# Patient Record
Sex: FEMALE | Race: ASIAN
[De-identification: names, ages, dates, MRNs, and addresses within clinical notes are randomized per-mention and may not be internally consistent; named-entity substitution may affect disease eponyms.]

---

## 2017-07-05 ENCOUNTER — RX ONLY (OUTPATIENT)
Age: 82
Setting detail: RX ONLY
End: 2017-07-05

## 2017-07-10 ENCOUNTER — RX ONLY (OUTPATIENT)
Age: 82
Setting detail: RX ONLY
End: 2017-07-10

## 2018-01-24 ENCOUNTER — RX ONLY (OUTPATIENT)
Age: 83
Setting detail: RX ONLY
End: 2018-01-24

## 2018-07-20 ENCOUNTER — RX ONLY (OUTPATIENT)
Age: 83
Setting detail: RX ONLY
End: 2018-07-20

## 2018-08-22 ENCOUNTER — RX ONLY (OUTPATIENT)
Age: 83
Setting detail: RX ONLY
End: 2018-08-22

## 2018-10-17 ENCOUNTER — APPOINTMENT (RX ONLY)
Dept: URBAN - METROPOLITAN AREA CLINIC 129 | Facility: CLINIC | Age: 83
Setting detail: DERMATOLOGY
End: 2018-10-17

## 2018-10-17 DIAGNOSIS — L50.1 IDIOPATHIC URTICARIA: ICD-10-CM | Status: WELL CONTROLLED

## 2018-10-17 PROCEDURE — ? COUNSELING

## 2018-10-17 PROCEDURE — 99214 OFFICE O/P EST MOD 30 MIN: CPT

## 2018-10-17 PROCEDURE — ? PRESCRIPTION

## 2018-10-17 ASSESSMENT — LOCATION DETAILED DESCRIPTION DERM
LOCATION DETAILED: PERIUMBILICAL SKIN
LOCATION DETAILED: MID TRAPEZIAL NECK

## 2018-10-17 ASSESSMENT — LOCATION SIMPLE DESCRIPTION DERM
LOCATION SIMPLE: ABDOMEN
LOCATION SIMPLE: TRAPEZIAL NECK

## 2018-10-17 ASSESSMENT — LOCATION ZONE DERM
LOCATION ZONE: TRUNK
LOCATION ZONE: NECK

## 2018-10-17 NOTE — HPI: RASH
What Type Of Note Output Would You Prefer (Optional)?: Standard Output
How Severe Is Your Rash?: moderate
Is This A New Presentation, Or A Follow-Up?: Follow Up Rash

## 2018-10-18 RX ORDER — OMALIZUMAB 202.5 MG/1.4ML
INJECTION, SOLUTION SUBCUTANEOUS
Qty: 2 | Refills: 2

## 2018-10-23 ENCOUNTER — APPOINTMENT (RX ONLY)
Dept: URBAN - METROPOLITAN AREA CLINIC 129 | Facility: CLINIC | Age: 83
Setting detail: DERMATOLOGY
End: 2018-10-23

## 2018-10-23 DIAGNOSIS — L50.1 IDIOPATHIC URTICARIA: ICD-10-CM

## 2018-10-23 PROCEDURE — ? PRESCRIPTION

## 2018-10-23 RX ORDER — OMALIZUMAB 202.5 MG/1.4ML
INJECTION, SOLUTION SUBCUTANEOUS
Qty: 2 | Refills: 3

## 2019-01-16 ENCOUNTER — APPOINTMENT (RX ONLY)
Dept: URBAN - METROPOLITAN AREA CLINIC 129 | Facility: CLINIC | Age: 84
Setting detail: DERMATOLOGY
End: 2019-01-16

## 2019-01-16 DIAGNOSIS — L50.1 IDIOPATHIC URTICARIA: ICD-10-CM | Status: WELL CONTROLLED

## 2019-01-16 PROCEDURE — ? TREATMENT REGIMEN

## 2019-01-16 PROCEDURE — ? COUNSELING

## 2019-01-16 PROCEDURE — ? PRESCRIPTION

## 2019-01-16 PROCEDURE — 99212 OFFICE O/P EST SF 10 MIN: CPT

## 2019-01-16 RX ORDER — LANOLIN ALCOHOL/MO/W.PET/CERES
CREAM (GRAM) TOPICAL
Qty: 1 | Refills: 6 | COMMUNITY
Start: 2019-01-16

## 2019-01-16 RX ORDER — TRIAMCINOLONE ACETONIDE 1 MG/G
OINTMENT TOPICAL
Qty: 1 | Refills: 3 | COMMUNITY
Start: 2019-01-16

## 2019-01-16 RX ADMIN — TRIAMCINOLONE ACETONIDE: 1 OINTMENT TOPICAL at 00:00

## 2019-01-16 RX ADMIN — Medication: at 00:00

## 2019-04-03 ENCOUNTER — APPOINTMENT (RX ONLY)
Dept: URBAN - METROPOLITAN AREA CLINIC 129 | Facility: CLINIC | Age: 84
Setting detail: DERMATOLOGY
End: 2019-04-03

## 2019-04-03 DIAGNOSIS — L30.9 DERMATITIS, UNSPECIFIED: ICD-10-CM

## 2019-04-03 DIAGNOSIS — L50.1 IDIOPATHIC URTICARIA: ICD-10-CM | Status: WELL CONTROLLED

## 2019-04-03 PROCEDURE — ? COUNSELING

## 2019-04-03 PROCEDURE — ? ADDITIONAL NOTES

## 2019-04-03 PROCEDURE — ? PRESCRIPTION

## 2019-04-03 PROCEDURE — 99213 OFFICE O/P EST LOW 20 MIN: CPT

## 2019-04-03 RX ORDER — TRIAMCINOLONE ACETONIDE 1 MG/G
OINTMENT TOPICAL
Qty: 1 | Refills: 3

## 2019-04-03 ASSESSMENT — LOCATION SIMPLE DESCRIPTION DERM
LOCATION SIMPLE: LEFT UPPER BACK
LOCATION SIMPLE: RIGHT LOWER BACK
LOCATION SIMPLE: ABDOMEN

## 2019-04-03 ASSESSMENT — LOCATION DETAILED DESCRIPTION DERM
LOCATION DETAILED: LEFT MID-UPPER BACK
LOCATION DETAILED: RIGHT LATERAL ABDOMEN
LOCATION DETAILED: RIGHT INFERIOR LATERAL LOWER BACK

## 2019-04-03 ASSESSMENT — LOCATION ZONE DERM: LOCATION ZONE: TRUNK

## 2019-04-03 NOTE — PROCEDURE: ADDITIONAL NOTES
Additional Notes: Recommended patient extend Xolair every 6 weeks for the next 3 treatments and then every 8 weeks. Will contact Dr. Eng to notify her to schedule Xolair every 6 weeks
Detail Level: Detailed

## 2019-04-17 ENCOUNTER — APPOINTMENT (RX ONLY)
Dept: URBAN - METROPOLITAN AREA CLINIC 129 | Facility: CLINIC | Age: 84
Setting detail: DERMATOLOGY
End: 2019-04-17

## 2019-04-17 DIAGNOSIS — L50.1 IDIOPATHIC URTICARIA: ICD-10-CM

## 2019-04-17 DIAGNOSIS — D22 MELANOCYTIC NEVI: ICD-10-CM

## 2019-04-17 DIAGNOSIS — L50.1 IDIOPATHIC URTICARIA: ICD-10-CM | Status: WELL CONTROLLED

## 2019-04-17 DIAGNOSIS — L30.9 DERMATITIS, UNSPECIFIED: ICD-10-CM

## 2019-04-17 PROBLEM — D22.62 MELANOCYTIC NEVI OF LEFT UPPER LIMB, INCLUDING SHOULDER: Status: ACTIVE | Noted: 2019-04-17

## 2019-04-17 PROCEDURE — ? PRESCRIPTION

## 2019-04-17 PROCEDURE — 99213 OFFICE O/P EST LOW 20 MIN: CPT | Mod: 25

## 2019-04-17 PROCEDURE — 11900 INJECT SKIN LESIONS </W 7: CPT

## 2019-04-17 PROCEDURE — ? BIOPSY BY SHAVE METHOD

## 2019-04-17 PROCEDURE — ? ADDITIONAL NOTES

## 2019-04-17 PROCEDURE — ? COUNSELING

## 2019-04-17 PROCEDURE — 11102 TANGNTL BX SKIN SINGLE LES: CPT

## 2019-04-17 PROCEDURE — ? INTRALESIONAL KENALOG

## 2019-04-17 RX ORDER — BETAMETHASONE DIPROPIONATE 0.5 MG/G
OINTMENT TOPICAL BID
Qty: 1 | Refills: 5 | COMMUNITY
Start: 2019-04-17

## 2019-04-17 RX ORDER — OMALIZUMAB 202.5 MG/1.4ML
INJECTION, SOLUTION SUBCUTANEOUS
Qty: 2 | Refills: 3

## 2019-04-17 RX ADMIN — BETAMETHASONE DIPROPIONATE: 0.5 OINTMENT TOPICAL at 00:00

## 2019-04-17 ASSESSMENT — LOCATION SIMPLE DESCRIPTION DERM
LOCATION SIMPLE: LEFT UPPER BACK
LOCATION SIMPLE: RIGHT SHOULDER
LOCATION SIMPLE: CHEST
LOCATION SIMPLE: LEFT SHOULDER
LOCATION SIMPLE: RIGHT LOWER BACK
LOCATION SIMPLE: ABDOMEN
LOCATION SIMPLE: RIGHT UPPER BACK

## 2019-04-17 ASSESSMENT — LOCATION DETAILED DESCRIPTION DERM
LOCATION DETAILED: RIGHT SUPERIOR MEDIAL UPPER BACK
LOCATION DETAILED: RIGHT INFERIOR LATERAL LOWER BACK
LOCATION DETAILED: UPPER STERNUM
LOCATION DETAILED: LEFT POSTERIOR SHOULDER
LOCATION DETAILED: RIGHT LATERAL ABDOMEN
LOCATION DETAILED: RIGHT SUPERIOR LATERAL LOWER BACK
LOCATION DETAILED: RIGHT POSTERIOR SHOULDER
LOCATION DETAILED: LEFT MEDIAL UPPER BACK
LOCATION DETAILED: LEFT SUPERIOR UPPER BACK

## 2019-04-17 ASSESSMENT — LOCATION ZONE DERM
LOCATION ZONE: ARM
LOCATION ZONE: TRUNK

## 2019-04-17 NOTE — PROCEDURE: BIOPSY BY SHAVE METHOD
Wound Care: Petrolatum
Type Of Destruction Used: Curettage
Detail Level: Detailed
Consent: Written consent was obtained and risks were reviewed including but not limited to scarring, infection, bleeding, scabbing, incomplete removal, nerve damage and allergy to anesthesia.
Biopsy Method: Dermablade
Electrodesiccation And Curettage Text: The wound bed was treated with electrodesiccation and curettage after the biopsy was performed.
Size Of Lesion In Cm: 0.6
X Size Of Lesion In Cm: 0
Anesthesia Type: 1% lidocaine with epinephrine
Electrodesiccation Text: The wound bed was treated with electrodesiccation after the biopsy was performed.
Biopsy Type: H and E
Billing Type: Third-Party Bill
Render Post-Care Instructions In Note?: no
Notification Instructions: Patient will be notified of biopsy results. However, patient instructed to call the office if not contacted within 2 weeks.
Dressing: bandage
Post-Care Instructions: I reviewed with the patient in detail post-care instructions. Patient is to keep the biopsy site dry overnight, and then apply bacitracin twice daily until healed. Patient may apply hydrogen peroxide soaks to remove any crusting.
Was A Bandage Applied: Yes
Silver Nitrate Text: The wound bed was treated with silver nitrate after the biopsy was performed.
Cryotherapy Text: The wound bed was treated with cryotherapy after the biopsy was performed.
Curettage Text: The wound bed was treated with curettage after the biopsy was performed.
Anesthesia Volume In Cc: 0.8
Depth Of Biopsy: dermis
Hemostasis: Drysol

## 2019-04-17 NOTE — PROCEDURE: INTRALESIONAL KENALOG
Include Z78.9 (Other Specified Conditions Influencing Health Status) As An Associated Diagnosis?: No
Concentration Of Kenalog Solution Injected (Mg/Ml): 2.5
Detail Level: Zone
Medical Necessity Clause: This procedure was medically necessary because the lesions that were treated were:
Kenalog Preparation: Kenalog
Total Volume (Ccs): 1
X Size Of Lesion In Cm (Optional): 0
Administered By (Optional): Savanna Nevarez, NP
Consent: The risks of atrophy were reviewed with the patient.

## 2019-04-17 NOTE — PROCEDURE: MIPS QUALITY
Detail Level: Detailed
Quality 111:Pneumonia Vaccination Status For Older Adults: Pneumococcal Vaccination Previously Received
Quality 110: Preventive Care And Screening: Influenza Immunization: Influenza Immunization Administered during Influenza season
Quality 474: Zoster Vaccination Status: Shingrix Vaccination Administered or Previously Received
Quality 226: Preventive Care And Screening: Tobacco Use: Screening And Cessation Intervention: Patient screened for tobacco use and is an ex/non-smoker
Quality 431: Preventive Care And Screening: Unhealthy Alcohol Use - Screening: Patient screened for unhealthy alcohol use using a single question and scores less than 2 times per year
Quality 402: Tobacco Use And Help With Quitting Among Adolescents: Patient screened for tobacco and never smoked

## 2019-07-31 ENCOUNTER — APPOINTMENT (RX ONLY)
Dept: URBAN - METROPOLITAN AREA CLINIC 129 | Facility: CLINIC | Age: 84
Setting detail: DERMATOLOGY
End: 2019-07-31

## 2019-07-31 DIAGNOSIS — L50.1 IDIOPATHIC URTICARIA: ICD-10-CM | Status: WELL CONTROLLED

## 2019-07-31 DIAGNOSIS — L82.1 OTHER SEBORRHEIC KERATOSIS: ICD-10-CM

## 2019-07-31 DIAGNOSIS — L82.0 INFLAMED SEBORRHEIC KERATOSIS: ICD-10-CM

## 2019-07-31 PROBLEM — Z79.899 OTHER LONG TERM (CURRENT) DRUG THERAPY: Status: ACTIVE | Noted: 2019-07-31

## 2019-07-31 PROBLEM — J30.1 ALLERGIC RHINITIS DUE TO POLLEN: Status: ACTIVE | Noted: 2019-07-31

## 2019-07-31 PROCEDURE — 17110 DESTRUCTION B9 LES UP TO 14: CPT

## 2019-07-31 PROCEDURE — 99213 OFFICE O/P EST LOW 20 MIN: CPT | Mod: 25

## 2019-07-31 PROCEDURE — ? ADDITIONAL NOTES

## 2019-07-31 PROCEDURE — ? COUNSELING

## 2019-07-31 PROCEDURE — ? LIQUID NITROGEN

## 2019-07-31 ASSESSMENT — LOCATION DETAILED DESCRIPTION DERM
LOCATION DETAILED: LEFT INGUINAL CREASE
LOCATION DETAILED: RIGHT SUPRAPUBIC SKIN
LOCATION DETAILED: LEFT MEDIAL UPPER BACK
LOCATION DETAILED: RIGHT SUPERIOR MEDIAL UPPER BACK
LOCATION DETAILED: LEFT ANTERIOR PROXIMAL THIGH
LOCATION DETAILED: PERIUMBILICAL SKIN
LOCATION DETAILED: RIGHT ANTERIOR PROXIMAL THIGH
LOCATION DETAILED: RIGHT LATERAL ABDOMEN

## 2019-07-31 ASSESSMENT — LOCATION SIMPLE DESCRIPTION DERM
LOCATION SIMPLE: ABDOMEN
LOCATION SIMPLE: RIGHT THIGH
LOCATION SIMPLE: RIGHT UPPER BACK
LOCATION SIMPLE: GROIN
LOCATION SIMPLE: LEFT THIGH
LOCATION SIMPLE: LEFT UPPER BACK

## 2019-07-31 ASSESSMENT — LOCATION ZONE DERM
LOCATION ZONE: LEG
LOCATION ZONE: TRUNK

## 2019-07-31 NOTE — PROCEDURE: LIQUID NITROGEN
Post-Care Instructions: I reviewed with the patient in detail post-care instructions. Patient is to wear sunprotection, and avoid picking at any of the treated lesions. Pt may apply Vaseline to crusted or scabbing areas.
Include Z78.9 (Other Specified Conditions Influencing Health Status) As An Associated Diagnosis?: No
Consent: The patient's consent was obtained including but not limited to risks of crusting, scabbing, blistering, scarring, darker or lighter pigmentary change, recurrence, incomplete removal and infection.
Medical Necessity Clause: This procedure was medically necessary because the lesions that were treated were:
Number Of Freeze-Thaw Cycles: 1 freeze-thaw cycle
Medical Necessity Information: It is in your best interest to select a reason for this procedure from the list below. All of these items fulfill various CMS LCD requirements except the new and changing color options.
Detail Level: Detailed

## 2019-11-06 ENCOUNTER — RX ONLY (OUTPATIENT)
Age: 84
Setting detail: RX ONLY
End: 2019-11-06

## 2019-11-06 ENCOUNTER — APPOINTMENT (RX ONLY)
Dept: URBAN - METROPOLITAN AREA CLINIC 129 | Facility: CLINIC | Age: 84
Setting detail: DERMATOLOGY
End: 2019-11-06

## 2019-11-06 DIAGNOSIS — L82.1 OTHER SEBORRHEIC KERATOSIS: ICD-10-CM

## 2019-11-06 DIAGNOSIS — L50.1 IDIOPATHIC URTICARIA: ICD-10-CM | Status: WELL CONTROLLED

## 2019-11-06 PROCEDURE — ? ADDITIONAL NOTES

## 2019-11-06 PROCEDURE — 99213 OFFICE O/P EST LOW 20 MIN: CPT

## 2019-11-06 PROCEDURE — ? COUNSELING

## 2019-11-06 PROCEDURE — ? PRESCRIPTION

## 2019-11-06 RX ORDER — TRIAMCINOLONE ACETONIDE 1 MG/G
OINTMENT TOPICAL
Qty: 1 | Refills: 3

## 2019-11-06 ASSESSMENT — LOCATION SIMPLE DESCRIPTION DERM
LOCATION SIMPLE: LEFT UPPER BACK
LOCATION SIMPLE: LEFT THIGH
LOCATION SIMPLE: ABDOMEN

## 2019-11-06 ASSESSMENT — LOCATION DETAILED DESCRIPTION DERM
LOCATION DETAILED: LEFT ANTERIOR PROXIMAL THIGH
LOCATION DETAILED: RIGHT LATERAL ABDOMEN
LOCATION DETAILED: PERIUMBILICAL SKIN
LOCATION DETAILED: LEFT MEDIAL UPPER BACK

## 2019-11-06 ASSESSMENT — LOCATION ZONE DERM
LOCATION ZONE: LEG
LOCATION ZONE: TRUNK

## 2019-11-06 NOTE — PROCEDURE: ADDITIONAL NOTES
Detail Level: Zone
Additional Notes: Continue Xolair every 6 weeks, Dr. Eng’s office is buying and billing the Xolair

## 2020-05-06 ENCOUNTER — APPOINTMENT (RX ONLY)
Dept: URBAN - METROPOLITAN AREA CLINIC 129 | Facility: CLINIC | Age: 85
Setting detail: DERMATOLOGY
End: 2020-05-06

## 2020-05-06 DIAGNOSIS — L50.1 IDIOPATHIC URTICARIA: ICD-10-CM

## 2020-05-06 PROCEDURE — 99213 OFFICE O/P EST LOW 20 MIN: CPT

## 2020-05-06 PROCEDURE — ? TREATMENT REGIMEN

## 2020-05-06 PROCEDURE — ? PRESCRIPTION

## 2020-05-06 PROCEDURE — ? COUNSELING

## 2020-05-06 RX ORDER — LANOLIN ALCOHOL/MO/W.PET/CERES
CREAM (GRAM) TOPICAL
Qty: 1 | Refills: 6

## 2020-11-04 ENCOUNTER — APPOINTMENT (RX ONLY)
Dept: URBAN - METROPOLITAN AREA CLINIC 129 | Facility: CLINIC | Age: 85
Setting detail: DERMATOLOGY
End: 2020-11-04

## 2020-11-04 DIAGNOSIS — L50.1 IDIOPATHIC URTICARIA: ICD-10-CM

## 2020-11-04 PROBLEM — R53.83 OTHER FATIGUE: Status: ACTIVE | Noted: 2020-11-04

## 2020-11-04 PROBLEM — E03.9 HYPOTHYROIDISM, UNSPECIFIED: Status: ACTIVE | Noted: 2020-11-04

## 2020-11-04 PROCEDURE — ? PRESCRIPTION

## 2020-11-04 PROCEDURE — 99213 OFFICE O/P EST LOW 20 MIN: CPT

## 2020-11-04 PROCEDURE — ? COUNSELING

## 2020-11-04 PROCEDURE — ? TREATMENT REGIMEN

## 2020-11-04 RX ORDER — LANOLIN ALCOHOL/MO/W.PET/CERES
CREAM (GRAM) TOPICAL
Qty: 1 | Refills: 3 | COMMUNITY
Start: 2020-11-04

## 2020-11-04 RX ORDER — LORATADINE 10 MG/1
CAPSULE, LIQUID FILLED ORAL
Qty: 60 | Refills: 3 | COMMUNITY
Start: 2020-11-04

## 2020-11-04 RX ORDER — FEXOFENADINE 180 MG/1
TABLET, FILM COATED ORAL
Qty: 60 | Refills: 3 | COMMUNITY
Start: 2020-11-04

## 2020-11-04 RX ADMIN — FEXOFENADINE: 180 TABLET, FILM COATED ORAL at 00:00

## 2020-11-04 RX ADMIN — LORATADINE: 10 CAPSULE, LIQUID FILLED ORAL at 00:00

## 2020-11-04 RX ADMIN — Medication: at 00:00

## 2020-11-04 NOTE — PROCEDURE: TREATMENT REGIMEN
Detail Level: Zone
Initiate Treatment: Claritin and Allegra bid daily.
Plan: Higher frequency of xolair
Continue Regimen: Xolair every 6 weeks

## 2020-12-09 ENCOUNTER — APPOINTMENT (RX ONLY)
Dept: URBAN - METROPOLITAN AREA CLINIC 129 | Facility: CLINIC | Age: 85
Setting detail: DERMATOLOGY
End: 2020-12-09

## 2020-12-09 VITALS — DIASTOLIC BLOOD PRESSURE: 73 MMHG | SYSTOLIC BLOOD PRESSURE: 150 MMHG

## 2020-12-09 DIAGNOSIS — L50.1 IDIOPATHIC URTICARIA: ICD-10-CM | Status: WELL CONTROLLED

## 2020-12-09 PROCEDURE — ? COUNSELING

## 2020-12-09 PROCEDURE — 99212 OFFICE O/P EST SF 10 MIN: CPT

## 2020-12-09 PROCEDURE — ? TREATMENT REGIMEN

## 2020-12-09 PROCEDURE — ? ADDITIONAL NOTES

## 2020-12-09 ASSESSMENT — LOCATION SIMPLE DESCRIPTION DERM: LOCATION SIMPLE: ABDOMEN

## 2020-12-09 ASSESSMENT — LOCATION DETAILED DESCRIPTION DERM: LOCATION DETAILED: PERIUMBILICAL SKIN

## 2020-12-09 ASSESSMENT — LOCATION ZONE DERM: LOCATION ZONE: TRUNK

## 2020-12-09 NOTE — PROCEDURE: ADDITIONAL NOTES
Additional Notes: Patient was complaining of headache and right chest pain while at her visit, checked her blood pressure. Patient started feeling better after a few minutes of sitting and drinking water
Detail Level: Zone

## 2020-12-09 NOTE — PROCEDURE: TREATMENT REGIMEN
Detail Level: Zone
Continue Regimen: Xolair every 6 weeks and continue Allegra once a day and Claritin once a day

## 2021-06-09 ENCOUNTER — APPOINTMENT (RX ONLY)
Dept: URBAN - METROPOLITAN AREA CLINIC 129 | Facility: CLINIC | Age: 86
Setting detail: DERMATOLOGY
End: 2021-06-09

## 2021-06-09 ENCOUNTER — RX ONLY (OUTPATIENT)
Age: 86
Setting detail: RX ONLY
End: 2021-06-09

## 2021-06-09 DIAGNOSIS — L82.1 OTHER SEBORRHEIC KERATOSIS: ICD-10-CM

## 2021-06-09 DIAGNOSIS — L50.1 IDIOPATHIC URTICARIA: ICD-10-CM | Status: WELL CONTROLLED

## 2021-06-09 PROCEDURE — ? TREATMENT REGIMEN

## 2021-06-09 PROCEDURE — 99213 OFFICE O/P EST LOW 20 MIN: CPT

## 2021-06-09 PROCEDURE — ? COUNSELING

## 2021-06-09 RX ORDER — LANOLIN ALCOHOL/MO/W.PET/CERES
CREAM (GRAM) TOPICAL
Qty: 1 | Refills: 3

## 2021-06-09 ASSESSMENT — LOCATION DETAILED DESCRIPTION DERM
LOCATION DETAILED: LEFT SUPERIOR UPPER BACK
LOCATION DETAILED: MID TRAPEZIAL NECK
LOCATION DETAILED: RIGHT SUPERIOR UPPER BACK
LOCATION DETAILED: PERIUMBILICAL SKIN
LOCATION DETAILED: RIGHT POSTERIOR SHOULDER

## 2021-06-09 ASSESSMENT — LOCATION SIMPLE DESCRIPTION DERM
LOCATION SIMPLE: RIGHT UPPER BACK
LOCATION SIMPLE: LEFT UPPER BACK
LOCATION SIMPLE: TRAPEZIAL NECK
LOCATION SIMPLE: ABDOMEN
LOCATION SIMPLE: RIGHT SHOULDER

## 2021-06-09 ASSESSMENT — LOCATION ZONE DERM
LOCATION ZONE: NECK
LOCATION ZONE: TRUNK
LOCATION ZONE: ARM

## 2021-09-13 ENCOUNTER — OFFICE VISIT (OUTPATIENT)
Dept: URBAN - METROPOLITAN AREA CLINIC 1 | Facility: CLINIC | Age: 86
End: 2021-09-13
Payer: MEDICARE

## 2021-09-13 DIAGNOSIS — H04.123 TEAR FILM INSUFFICIENCY OF BILATERAL LACRIMAL GLANDS: Primary | ICD-10-CM

## 2021-09-13 DIAGNOSIS — H02.052 TRICHIASIS WITHOUT ENTROPION RIGHT LOWER EYELID: ICD-10-CM

## 2021-09-13 PROCEDURE — 67820 REVISE EYELASHES: CPT | Performed by: SPECIALIST

## 2021-09-13 PROCEDURE — 99213 OFFICE O/P EST LOW 20 MIN: CPT | Performed by: SPECIALIST

## 2021-09-13 NOTE — IMPRESSION/PLAN
Impression: Tear film insufficiency of bilateral lacrimal glands: H04.123. Plan: K abrasion resolved OD. Dry eyes account for the patient's complaints. There is no evidence of permanent changes to the cornea. Explained condition does not have a cure and will need artificial tears for maintenance.

## 2021-09-13 NOTE — IMPRESSION/PLAN
Impression: Trichiasis without entropion right lower eyelid: H02.052. Plan: RLL epilated today at slit lamp without complication. Typically tx'd with a laser.

## 2021-12-08 ENCOUNTER — APPOINTMENT (RX ONLY)
Dept: URBAN - METROPOLITAN AREA CLINIC 129 | Facility: CLINIC | Age: 86
Setting detail: DERMATOLOGY
End: 2021-12-08

## 2021-12-08 DIAGNOSIS — L50.1 IDIOPATHIC URTICARIA: ICD-10-CM | Status: STABLE

## 2021-12-08 DIAGNOSIS — L82.1 OTHER SEBORRHEIC KERATOSIS: ICD-10-CM

## 2021-12-08 PROCEDURE — 99213 OFFICE O/P EST LOW 20 MIN: CPT

## 2021-12-08 PROCEDURE — ? TREATMENT REGIMEN

## 2021-12-08 PROCEDURE — ? COUNSELING

## 2021-12-08 ASSESSMENT — LOCATION DETAILED DESCRIPTION DERM
LOCATION DETAILED: RIGHT INFERIOR LATERAL NECK
LOCATION DETAILED: PERIUMBILICAL SKIN

## 2021-12-08 ASSESSMENT — LOCATION ZONE DERM
LOCATION ZONE: NECK
LOCATION ZONE: TRUNK

## 2021-12-08 ASSESSMENT — LOCATION SIMPLE DESCRIPTION DERM
LOCATION SIMPLE: RIGHT ANTERIOR NECK
LOCATION SIMPLE: ABDOMEN

## 2022-05-23 ENCOUNTER — OFFICE VISIT (OUTPATIENT)
Dept: URBAN - METROPOLITAN AREA CLINIC 1 | Facility: CLINIC | Age: 87
End: 2022-05-23
Payer: MEDICARE

## 2022-05-23 DIAGNOSIS — H04.123 TEAR FILM INSUFFICIENCY OF BILATERAL LACRIMAL GLANDS: ICD-10-CM

## 2022-05-23 DIAGNOSIS — H02.054 TRICHIASIS WITHOUT ENTROPION, LEFT UPPER LID: ICD-10-CM

## 2022-05-23 DIAGNOSIS — Z96.1 PRESENCE OF INTRAOCULAR LENS: ICD-10-CM

## 2022-05-23 DIAGNOSIS — H02.052 TRICHIASIS WITHOUT ENTROPION, RIGHT LOWER LID: Primary | ICD-10-CM

## 2022-05-23 DIAGNOSIS — H01.024 SQUAMOUS BLEPHARITIS OF LEFT UPPER LID: ICD-10-CM

## 2022-05-23 DIAGNOSIS — H35.3131 NONEXUDATIVE MACULAR DEGENERATION, EARLY DRY STAGE, BILATERAL: ICD-10-CM

## 2022-05-23 DIAGNOSIS — H01.021 SQUAMOUS BLEPHARITIS OF RIGHT UPPER LID: ICD-10-CM

## 2022-05-23 PROCEDURE — 99213 OFFICE O/P EST LOW 20 MIN: CPT | Performed by: OPHTHALMOLOGY

## 2022-05-23 PROCEDURE — 92134 CPTRZ OPH DX IMG PST SGM RTA: CPT | Performed by: OPHTHALMOLOGY

## 2022-05-23 RX ORDER — PREDNISOLONE ACETATE 10 MG/ML
1 % SUSPENSION/ DROPS OPHTHALMIC
Qty: 5 | Refills: 1 | Status: ACTIVE
Start: 2022-05-23

## 2022-05-23 ASSESSMENT — INTRAOCULAR PRESSURE
OS: 15
OD: 15

## 2022-05-23 NOTE — IMPRESSION/PLAN
Impression: Trichiasis without entropion, right lower lid: H02.052. Plan: Discussed diagnosis in detail with patient. Today we do not see Trichiasis in either eyelid. Will continue to monitor.

## 2022-05-23 NOTE — IMPRESSION/PLAN
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 Plan: Blepharitis/Meibomian Gland Dysfunction bilateral upper and lower lidBleps due to demodex infestation. Start: 1. Artificial tears QID OU 2. Occusoft Tea Tree Oil (allergy) lid wipes 3. Prednisolone Taper 4-3-2-1 x 1 month Instructions given. 

F/U in 1 month

## 2022-05-23 NOTE — IMPRESSION/PLAN
Impression: Tear film insufficiency of bilateral lacrimal glands: H04.123.  Plan: Pt to continue on AT;s

## 2022-06-08 ENCOUNTER — RX ONLY (OUTPATIENT)
Age: 87
Setting detail: RX ONLY
End: 2022-06-08

## 2022-06-08 ENCOUNTER — APPOINTMENT (RX ONLY)
Dept: URBAN - METROPOLITAN AREA CLINIC 129 | Facility: CLINIC | Age: 87
Setting detail: DERMATOLOGY
End: 2022-06-08

## 2022-06-08 DIAGNOSIS — L29.89 OTHER PRURITUS: ICD-10-CM

## 2022-06-08 DIAGNOSIS — L29.8 OTHER PRURITUS: ICD-10-CM

## 2022-06-08 DIAGNOSIS — L50.1 IDIOPATHIC URTICARIA: ICD-10-CM | Status: IMPROVED

## 2022-06-08 PROCEDURE — ? ADDITIONAL NOTES

## 2022-06-08 PROCEDURE — ? PRESCRIPTION

## 2022-06-08 PROCEDURE — 99213 OFFICE O/P EST LOW 20 MIN: CPT

## 2022-06-08 PROCEDURE — ? TREATMENT REGIMEN

## 2022-06-08 PROCEDURE — ? COUNSELING

## 2022-06-08 RX ORDER — TRIAMCINOLONE ACETONIDE 1 MG/G
OINTMENT TOPICAL
Qty: 80 | Refills: 2

## 2022-06-08 RX ORDER — TRIAMCINOLONE ACETONIDE 1 MG/G
OINTMENT TOPICAL
Qty: 80 | Refills: 2 | Status: ERX | COMMUNITY
Start: 2022-06-08

## 2022-06-08 RX ORDER — CAPSAICIN 0.1 G/100G
CREAM TOPICAL
Qty: 60 | Refills: 6 | Status: ERX | COMMUNITY
Start: 2022-06-08

## 2022-06-08 RX ORDER — CAPSAICIN 0.1 G/100G
CREAM TOPICAL
Qty: 60 | Refills: 6

## 2022-06-08 RX ADMIN — CAPSAICIN: 0.1 CREAM TOPICAL at 00:00

## 2022-06-08 RX ADMIN — TRIAMCINOLONE ACETONIDE: 1 OINTMENT TOPICAL at 00:00

## 2022-06-08 ASSESSMENT — LOCATION SIMPLE DESCRIPTION DERM
LOCATION SIMPLE: ABDOMEN
LOCATION SIMPLE: UPPER BACK

## 2022-06-08 ASSESSMENT — LOCATION DETAILED DESCRIPTION DERM
LOCATION DETAILED: SUPERIOR THORACIC SPINE
LOCATION DETAILED: PERIUMBILICAL SKIN
LOCATION DETAILED: SUBXIPHOID

## 2022-06-08 ASSESSMENT — LOCATION ZONE DERM: LOCATION ZONE: TRUNK

## 2022-06-08 NOTE — PROCEDURE: ADDITIONAL NOTES
Render Risk Assessment In Note?: no
Detail Level: Simple
Additional Notes: Consider topical Doxepin if symptoms persist.

## 2022-06-29 ENCOUNTER — OFFICE VISIT (OUTPATIENT)
Dept: URBAN - METROPOLITAN AREA CLINIC 1 | Facility: CLINIC | Age: 87
End: 2022-06-29
Payer: MEDICARE

## 2022-06-29 DIAGNOSIS — H02.052 TRICHIASIS WITHOUT ENTROPION RIGHT LOWER EYELID: Primary | ICD-10-CM

## 2022-06-29 DIAGNOSIS — H01.021 SQUAMOUS BLEPHARITIS OF RIGHT UPPER LID: ICD-10-CM

## 2022-06-29 DIAGNOSIS — H01.024 SQUAMOUS BLEPHARITIS OF LEFT UPPER LID: ICD-10-CM

## 2022-06-29 PROCEDURE — 99212 OFFICE O/P EST SF 10 MIN: CPT | Performed by: OPHTHALMOLOGY

## 2022-06-29 ASSESSMENT — INTRAOCULAR PRESSURE
OS: 14
OD: 14

## 2022-06-29 NOTE — IMPRESSION/PLAN
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 Plan: PT's condition has improved at this point we will have pt finish Pred 1% . Pt to continue wipes PRN and At's QID OU.

## 2022-06-29 NOTE — IMPRESSION/PLAN
Impression: Trichiasis without entropion right lower eyelid: H02.052. Plan: Plugged on today's visit pt to continue with Dr. Merle Crane

## 2022-12-14 ENCOUNTER — APPOINTMENT (RX ONLY)
Dept: URBAN - METROPOLITAN AREA CLINIC 129 | Facility: CLINIC | Age: 87
Setting detail: DERMATOLOGY
End: 2022-12-14

## 2022-12-14 DIAGNOSIS — L29.89 OTHER PRURITUS: ICD-10-CM

## 2022-12-14 DIAGNOSIS — L50.1 IDIOPATHIC URTICARIA: ICD-10-CM

## 2022-12-14 DIAGNOSIS — L29.8 OTHER PRURITUS: ICD-10-CM

## 2022-12-14 PROCEDURE — ? ADDITIONAL NOTES

## 2022-12-14 PROCEDURE — ? PRESCRIPTION

## 2022-12-14 PROCEDURE — ? COUNSELING

## 2022-12-14 PROCEDURE — 99213 OFFICE O/P EST LOW 20 MIN: CPT

## 2022-12-14 RX ORDER — DOXEPIN HYDROCHLORIDE 10 MG/1
CAPSULE ORAL
Qty: 30 | Refills: 1 | COMMUNITY
Start: 2022-12-14

## 2022-12-14 RX ADMIN — DOXEPIN HYDROCHLORIDE: 10 CAPSULE ORAL at 00:00

## 2022-12-14 ASSESSMENT — LOCATION DETAILED DESCRIPTION DERM
LOCATION DETAILED: MID TRAPEZIAL NECK
LOCATION DETAILED: PERIUMBILICAL SKIN

## 2022-12-14 ASSESSMENT — LOCATION ZONE DERM
LOCATION ZONE: NECK
LOCATION ZONE: TRUNK

## 2022-12-14 ASSESSMENT — LOCATION SIMPLE DESCRIPTION DERM
LOCATION SIMPLE: TRAPEZIAL NECK
LOCATION SIMPLE: ABDOMEN

## 2022-12-14 NOTE — PROCEDURE: ADDITIONAL NOTES
Render Risk Assessment In Note?: no
Additional Notes: Restart xolair with Dr Eng as soon as possible
Detail Level: Simple

## 2022-12-29 ENCOUNTER — OFFICE VISIT (OUTPATIENT)
Dept: URBAN - METROPOLITAN AREA CLINIC 1 | Facility: CLINIC | Age: 87
End: 2022-12-29
Payer: MEDICARE

## 2022-12-29 DIAGNOSIS — H02.052 TRICHIASIS WITHOUT ENTROPION RIGHT LOWER EYELID: Primary | ICD-10-CM

## 2022-12-29 DIAGNOSIS — H02.055 TRICHIASIS WITHOUT ENTROPION LEFT LOWER EYELID: ICD-10-CM

## 2022-12-29 DIAGNOSIS — H04.123 TEAR FILM INSUFFICIENCY OF BILATERAL LACRIMAL GLANDS: ICD-10-CM

## 2022-12-29 PROCEDURE — 99213 OFFICE O/P EST LOW 20 MIN: CPT | Performed by: OPHTHALMOLOGY

## 2022-12-29 ASSESSMENT — INTRAOCULAR PRESSURE
OS: 16
OD: 16

## 2022-12-29 NOTE — IMPRESSION/PLAN
Impression: Trichiasis without entropion left lower eyelid: H02.055. Plan:  Discussed diagnosis in detail with patient. Discussed treatment options with patient. One eyelash was removed from lower lid.

## 2022-12-29 NOTE — IMPRESSION/PLAN
Impression: Trichiasis without entropion right lower eyelid: H02.052. Plan: Discussed diagnosis in detail with patient. Discussed treatment options with patient. One eyelash was removed from lower lid.

## 2023-02-15 ENCOUNTER — APPOINTMENT (RX ONLY)
Dept: URBAN - METROPOLITAN AREA CLINIC 129 | Facility: CLINIC | Age: 88
Setting detail: DERMATOLOGY
End: 2023-02-15

## 2023-02-15 DIAGNOSIS — L85.3 XEROSIS CUTIS: ICD-10-CM

## 2023-02-15 DIAGNOSIS — L50.1 IDIOPATHIC URTICARIA: ICD-10-CM

## 2023-02-15 DIAGNOSIS — L29.89 OTHER PRURITUS: ICD-10-CM

## 2023-02-15 DIAGNOSIS — L29.8 OTHER PRURITUS: ICD-10-CM

## 2023-02-15 PROCEDURE — ? COUNSELING

## 2023-02-15 PROCEDURE — 99214 OFFICE O/P EST MOD 30 MIN: CPT

## 2023-02-15 PROCEDURE — ? TREATMENT REGIMEN

## 2023-02-15 PROCEDURE — ? ADDITIONAL NOTES

## 2023-02-15 PROCEDURE — ? PRESCRIPTION

## 2023-02-15 RX ORDER — LORATADINE 10 MG/1
CAPSULE, LIQUID FILLED ORAL
Qty: 120 | Refills: 1 | Status: ERX | COMMUNITY
Start: 2023-02-15

## 2023-02-15 RX ORDER — DOXEPIN HYDROCHLORIDE 10 MG/1
CAPSULE ORAL
Qty: 30 | Refills: 1 | Status: ERX

## 2023-02-15 RX ORDER — MINERAL OIL/I-PROP MYR/WATER
LOTION (ML) TOPICAL
Qty: 473 | Refills: 0 | Status: ERX | COMMUNITY
Start: 2023-02-15

## 2023-02-15 RX ADMIN — LORATADINE: 10 CAPSULE, LIQUID FILLED ORAL at 00:00

## 2023-02-15 RX ADMIN — Medication: at 00:00

## 2023-02-15 ASSESSMENT — LOCATION DETAILED DESCRIPTION DERM
LOCATION DETAILED: LEFT ANTERIOR SHOULDER
LOCATION DETAILED: RIGHT ANTERIOR SHOULDER
LOCATION DETAILED: PERIUMBILICAL SKIN
LOCATION DETAILED: MID TRAPEZIAL NECK

## 2023-02-15 ASSESSMENT — LOCATION SIMPLE DESCRIPTION DERM
LOCATION SIMPLE: RIGHT SHOULDER
LOCATION SIMPLE: ABDOMEN
LOCATION SIMPLE: TRAPEZIAL NECK
LOCATION SIMPLE: LEFT SHOULDER

## 2023-02-15 ASSESSMENT — LOCATION ZONE DERM
LOCATION ZONE: NECK
LOCATION ZONE: TRUNK
LOCATION ZONE: ARM

## 2023-02-15 NOTE — PROCEDURE: ADDITIONAL NOTES
Render Risk Assessment In Note?: no
Additional Notes: Getting xolair with Dr Eng every 6 weeks
Detail Level: Simple

## 2023-06-08 ENCOUNTER — APPOINTMENT (RX ONLY)
Dept: URBAN - METROPOLITAN AREA CLINIC 129 | Facility: CLINIC | Age: 88
Setting detail: DERMATOLOGY
End: 2023-06-08

## 2023-06-08 DIAGNOSIS — L50.1 IDIOPATHIC URTICARIA: ICD-10-CM

## 2023-06-08 DIAGNOSIS — L85.3 XEROSIS CUTIS: ICD-10-CM

## 2023-06-08 DIAGNOSIS — L29.8 OTHER PRURITUS: ICD-10-CM

## 2023-06-08 DIAGNOSIS — L81.4 OTHER MELANIN HYPERPIGMENTATION: ICD-10-CM

## 2023-06-08 DIAGNOSIS — L29.89 OTHER PRURITUS: ICD-10-CM

## 2023-06-08 PROCEDURE — ? TREATMENT REGIMEN

## 2023-06-08 PROCEDURE — 99214 OFFICE O/P EST MOD 30 MIN: CPT

## 2023-06-08 PROCEDURE — ? PRESCRIPTION

## 2023-06-08 PROCEDURE — ? ADDITIONAL NOTES

## 2023-06-08 PROCEDURE — ? COUNSELING

## 2023-06-08 RX ORDER — MINERAL OIL/I-PROP MYR/WATER
LOTION (ML) TOPICAL
Qty: 473 | Refills: 0 | Status: ERX

## 2023-06-08 RX ORDER — HYDROQUINONE 4 %
CREAM (GRAM) TOPICAL
Qty: 28.35 | Refills: 2 | Status: ERX | COMMUNITY
Start: 2023-06-08

## 2023-06-08 RX ADMIN — Medication: at 00:00

## 2023-06-08 ASSESSMENT — LOCATION DETAILED DESCRIPTION DERM
LOCATION DETAILED: LEFT CENTRAL MALAR CHEEK
LOCATION DETAILED: RIGHT ANTERIOR SHOULDER
LOCATION DETAILED: LEFT ANTERIOR SHOULDER
LOCATION DETAILED: MID TRAPEZIAL NECK
LOCATION DETAILED: RIGHT CENTRAL MALAR CHEEK
LOCATION DETAILED: PERIUMBILICAL SKIN

## 2023-06-08 ASSESSMENT — LOCATION SIMPLE DESCRIPTION DERM
LOCATION SIMPLE: ABDOMEN
LOCATION SIMPLE: TRAPEZIAL NECK
LOCATION SIMPLE: RIGHT SHOULDER
LOCATION SIMPLE: RIGHT CHEEK
LOCATION SIMPLE: LEFT SHOULDER
LOCATION SIMPLE: LEFT CHEEK

## 2023-06-08 ASSESSMENT — LOCATION ZONE DERM
LOCATION ZONE: NECK
LOCATION ZONE: ARM
LOCATION ZONE: TRUNK
LOCATION ZONE: FACE

## 2023-06-08 NOTE — PROCEDURE: TREATMENT REGIMEN
Modify Regimen: Claritin 1 po bid
Continue Regimen: Claritin 10 mg tablet po daily, Allegra 180 mg once daily
Detail Level: Zone
Continue Regimen: Doxepin q hs

## 2023-07-05 ENCOUNTER — OFFICE VISIT (OUTPATIENT)
Dept: URBAN - METROPOLITAN AREA CLINIC 1 | Facility: CLINIC | Age: 88
End: 2023-07-05
Payer: MEDICARE

## 2023-07-05 DIAGNOSIS — H02.052 TRICHIASIS WITHOUT ENTROPION RIGHT LOWER EYELID: ICD-10-CM

## 2023-07-05 DIAGNOSIS — H04.123 TEAR FILM INSUFFICIENCY OF BILATERAL LACRIMAL GLANDS: ICD-10-CM

## 2023-07-05 DIAGNOSIS — H35.3131 NONEXUDATIVE MACULAR DEGENERATION, EARLY DRY STAGE, BILATERAL: Primary | ICD-10-CM

## 2023-07-05 DIAGNOSIS — H02.055 TRICHIASIS WITHOUT ENTROPION LEFT LOWER EYELID: ICD-10-CM

## 2023-07-05 DIAGNOSIS — Z96.1 PRESENCE OF INTRAOCULAR LENS: ICD-10-CM

## 2023-07-05 PROCEDURE — 99213 OFFICE O/P EST LOW 20 MIN: CPT

## 2023-07-05 PROCEDURE — 92134 CPTRZ OPH DX IMG PST SGM RTA: CPT

## 2023-07-05 ASSESSMENT — INTRAOCULAR PRESSURE
OS: 13
OD: 13

## 2023-07-05 NOTE — IMPRESSION/PLAN
Impression: Trichiasis without entropion right lower eyelid: H02.052. Plan: OU: Discussed diagnosis in detail with patient. Advised patient of condition. Will continue to observe condition and or symptoms. Ingrown eyelashes were removed on today's visit RLL and LLL.

## 2023-07-05 NOTE — IMPRESSION/PLAN
Impression: Tear film insufficiency of bilateral lacrimal glands: H04.123. Plan: Recommend use of high quality artificial tears 3-4x per day prn. Written Rx for gel drops was given.

## 2023-07-05 NOTE — IMPRESSION/PLAN
Impression: Nonexudative macular degeneration, early dry stage, bilateral: H35.9715. Plan: Dry Macular Degeneration
-Discussed disease process with patient.  
-Recommended AREDS2 vitamins with anti-oxidants and Amsler grid monitoring daily.  
-Discussed avoiding cigarette smoke, including second-hand smoke.   
-Return immediately for any worsening symptoms or change in vision
-Continue to monitor at annual eye exams

## 2023-09-12 ENCOUNTER — APPOINTMENT (RX ONLY)
Dept: URBAN - METROPOLITAN AREA CLINIC 129 | Facility: CLINIC | Age: 88
Setting detail: DERMATOLOGY
End: 2023-09-12

## 2023-09-12 DIAGNOSIS — L29.8 OTHER PRURITUS: ICD-10-CM

## 2023-09-12 DIAGNOSIS — L50.1 IDIOPATHIC URTICARIA: ICD-10-CM

## 2023-09-12 DIAGNOSIS — L29.89 OTHER PRURITUS: ICD-10-CM

## 2023-09-12 PROCEDURE — ? ADDITIONAL NOTES

## 2023-09-12 PROCEDURE — ? COUNSELING

## 2023-09-12 PROCEDURE — 99214 OFFICE O/P EST MOD 30 MIN: CPT

## 2023-09-12 PROCEDURE — ? TREATMENT REGIMEN

## 2023-09-12 PROCEDURE — ? PRESCRIPTION

## 2023-09-12 RX ORDER — DOXEPIN HYDROCHLORIDE 10 MG/1
CAPSULE ORAL
Qty: 90 | Refills: 3 | Status: ERX

## 2023-09-12 RX ORDER — LANOLIN ALCOHOL/MO/W.PET/CERES
CREAM (GRAM) TOPICAL
Qty: 454 | Refills: 11 | Status: ERX | COMMUNITY
Start: 2023-09-12

## 2023-09-12 RX ORDER — SALICYLIC ACID 3 G/100G
LOTION TOPICAL
Qty: 90 | Refills: 3 | Status: ERX | COMMUNITY
Start: 2023-09-12

## 2023-09-12 RX ORDER — LORATADINE 10 MG/1
CAPSULE, LIQUID FILLED ORAL
Qty: 120 | Refills: 3 | Status: ERX

## 2023-09-12 RX ADMIN — SALICYLIC ACID: 3 LOTION TOPICAL at 00:00

## 2023-09-12 RX ADMIN — Medication: at 00:00

## 2023-09-12 ASSESSMENT — LOCATION ZONE DERM
LOCATION ZONE: NECK
LOCATION ZONE: TRUNK

## 2023-09-12 ASSESSMENT — LOCATION SIMPLE DESCRIPTION DERM
LOCATION SIMPLE: ABDOMEN
LOCATION SIMPLE: TRAPEZIAL NECK

## 2023-09-12 ASSESSMENT — LOCATION DETAILED DESCRIPTION DERM
LOCATION DETAILED: PERIUMBILICAL SKIN
LOCATION DETAILED: MID TRAPEZIAL NECK

## 2023-09-12 NOTE — PROCEDURE: TREATMENT REGIMEN
Continue Regimen: Claritin 10 mg tablet po daily, Allegra 180 mg once daily, Doxepin 10mg at bedtime
Detail Level: Zone

## 2023-10-18 ENCOUNTER — OFFICE VISIT (OUTPATIENT)
Dept: URBAN - METROPOLITAN AREA CLINIC 1 | Facility: CLINIC | Age: 88
End: 2023-10-18
Payer: MEDICARE

## 2023-10-18 DIAGNOSIS — H04.123 DRY EYE SYNDROME OF BILATERAL LACRIMAL GLANDS: ICD-10-CM

## 2023-10-18 DIAGNOSIS — H02.052 TRICHIASIS WITHOUT ENTROPION, RIGHT LOWER LID: Primary | ICD-10-CM

## 2023-10-18 DIAGNOSIS — H02.055 TRICHIASIS WITHOUT ENTROPION LEFT LOWER EYELID: ICD-10-CM

## 2023-10-18 PROCEDURE — 99212 OFFICE O/P EST SF 10 MIN: CPT

## 2023-10-18 PROCEDURE — 67820 REVISE EYELASHES: CPT

## 2023-10-18 ASSESSMENT — INTRAOCULAR PRESSURE
OS: 14
OD: 13

## 2024-01-10 ENCOUNTER — OFFICE VISIT (OUTPATIENT)
Dept: URBAN - METROPOLITAN AREA CLINIC 1 | Facility: CLINIC | Age: 89
End: 2024-01-10
Payer: MEDICARE

## 2024-01-10 DIAGNOSIS — H02.052 TRICHIASIS WITHOUT ENTROPION RIGHT LOWER EYELID: Primary | ICD-10-CM

## 2024-01-10 DIAGNOSIS — H04.123 DRY EYE SYNDROME OF BILATERAL LACRIMAL GLANDS: ICD-10-CM

## 2024-01-10 DIAGNOSIS — H02.055 TRICHIASIS WITHOUT ENTROPION LEFT LOWER EYELID: ICD-10-CM

## 2024-01-10 PROCEDURE — 99212 OFFICE O/P EST SF 10 MIN: CPT

## 2024-01-10 RX ORDER — ERYTHROMYCIN 5 MG/G
OINTMENT OPHTHALMIC
Qty: 5 | Refills: 3 | Status: ACTIVE
Start: 2024-01-10

## 2024-01-10 ASSESSMENT — INTRAOCULAR PRESSURE
OD: 17
OS: 16

## 2024-07-10 ENCOUNTER — OFFICE VISIT (OUTPATIENT)
Dept: URBAN - METROPOLITAN AREA CLINIC 1 | Facility: CLINIC | Age: 89
End: 2024-07-10
Payer: MEDICARE

## 2024-07-10 DIAGNOSIS — Z96.1 PRESENCE OF INTRAOCULAR LENS: ICD-10-CM

## 2024-07-10 DIAGNOSIS — H02.059 TRICHIASIS W/O ENTROPION OF EYELID: Primary | ICD-10-CM

## 2024-07-10 DIAGNOSIS — H04.123 DRY EYE SYNDROME OF BILATERAL LACRIMAL GLANDS: ICD-10-CM

## 2024-07-10 DIAGNOSIS — H35.3131 NONEXUDATIVE MACULAR DEGENERATION, EARLY DRY STAGE, BILATERAL: ICD-10-CM

## 2024-07-10 PROCEDURE — 92134 CPTRZ OPH DX IMG PST SGM RTA: CPT

## 2024-07-10 PROCEDURE — 99213 OFFICE O/P EST LOW 20 MIN: CPT

## 2024-07-10 ASSESSMENT — INTRAOCULAR PRESSURE
OS: 17
OD: 18

## 2024-09-05 ENCOUNTER — OFFICE VISIT (OUTPATIENT)
Dept: URBAN - METROPOLITAN AREA CLINIC 1 | Facility: CLINIC | Age: 89
End: 2024-09-05
Payer: MEDICARE

## 2024-09-05 DIAGNOSIS — L03.213 PRESEPTAL CELLULITIS: Primary | ICD-10-CM

## 2024-09-05 PROCEDURE — 99213 OFFICE O/P EST LOW 20 MIN: CPT

## 2024-09-05 RX ORDER — DOXYCYCLINE HYCLATE 100 MG/1
100 MG TABLET, COATED ORAL
Qty: 14 | Refills: 0 | Status: ACTIVE
Start: 2024-09-05

## 2024-09-05 ASSESSMENT — INTRAOCULAR PRESSURE
OS: 22
OD: 22

## 2024-09-12 ENCOUNTER — OFFICE VISIT (OUTPATIENT)
Dept: URBAN - METROPOLITAN AREA CLINIC 1 | Facility: CLINIC | Age: 89
End: 2024-09-12
Payer: MEDICARE

## 2024-09-12 DIAGNOSIS — H02.839 DERMATOCHALASIS OF EYELID: ICD-10-CM

## 2024-09-12 DIAGNOSIS — L03.213 PRESEPTAL CELLULITIS: Primary | ICD-10-CM

## 2024-09-12 PROCEDURE — 99213 OFFICE O/P EST LOW 20 MIN: CPT

## 2024-09-12 ASSESSMENT — INTRAOCULAR PRESSURE
OD: 15
OS: 15

## 2024-11-20 ENCOUNTER — APPOINTMENT (RX ONLY)
Dept: URBAN - METROPOLITAN AREA CLINIC 129 | Facility: CLINIC | Age: 89
Setting detail: DERMATOLOGY
End: 2024-11-20

## 2024-11-20 DIAGNOSIS — L20.89 OTHER ATOPIC DERMATITIS: ICD-10-CM

## 2024-11-20 PROCEDURE — 99213 OFFICE O/P EST LOW 20 MIN: CPT

## 2024-11-20 PROCEDURE — ? PRESCRIPTION

## 2024-11-20 PROCEDURE — ? COUNSELING

## 2024-11-20 PROCEDURE — ? TREATMENT REGIMEN

## 2024-11-20 RX ORDER — CLOBETASOL PROPIONATE 0.46 MG/ML
SOLUTION TOPICAL
Qty: 25 | Refills: 3 | Status: ERX | COMMUNITY
Start: 2024-11-20

## 2024-11-20 RX ADMIN — CLOBETASOL PROPIONATE: 0.46 SOLUTION TOPICAL at 00:00

## 2024-11-20 ASSESSMENT — LOCATION DETAILED DESCRIPTION DERM: LOCATION DETAILED: RIGHT INFERIOR LATERAL NECK

## 2024-11-20 ASSESSMENT — LOCATION ZONE DERM: LOCATION ZONE: NECK

## 2024-11-20 ASSESSMENT — LOCATION SIMPLE DESCRIPTION DERM: LOCATION SIMPLE: RIGHT ANTERIOR NECK

## 2024-11-20 NOTE — PROCEDURE: TREATMENT REGIMEN
Plan: Mix clobetasol scalp solution with a 16 oz CeraVe moisturizing cream
Detail Level: Zone
Initiate Treatment: clobetasol scalp solution and CeraVe tub mixture - apply twice a day to affected areas of neck Monday through Friday.

## 2025-04-16 ENCOUNTER — OFFICE VISIT (OUTPATIENT)
Dept: URBAN - METROPOLITAN AREA CLINIC 1 | Facility: CLINIC | Age: OVER 89
End: 2025-04-16
Payer: MEDICARE

## 2025-04-16 DIAGNOSIS — Z96.1 PRESENCE OF INTRAOCULAR LENS: ICD-10-CM

## 2025-04-16 DIAGNOSIS — H04.123 DRY EYE SYNDROME OF BILATERAL LACRIMAL GLANDS: ICD-10-CM

## 2025-04-16 DIAGNOSIS — H35.3131 NONEXUDATIVE AGE-RELATED MACULAR DEGENERATION, BILATERAL, EARLY DRY STAGE: Primary | ICD-10-CM

## 2025-04-16 PROCEDURE — 99212 OFFICE O/P EST SF 10 MIN: CPT | Performed by: OPHTHALMOLOGY

## 2025-04-16 PROCEDURE — 92134 CPTRZ OPH DX IMG PST SGM RTA: CPT | Performed by: OPHTHALMOLOGY

## 2025-04-16 ASSESSMENT — INTRAOCULAR PRESSURE
OD: 14
OS: 14